# Patient Record
Sex: FEMALE | URBAN - METROPOLITAN AREA
[De-identification: names, ages, dates, MRNs, and addresses within clinical notes are randomized per-mention and may not be internally consistent; named-entity substitution may affect disease eponyms.]

---

## 2024-11-06 ENCOUNTER — NURSING HOME VISIT (OUTPATIENT)
Dept: POST ACUTE CARE | Facility: EXTERNAL LOCATION | Age: 84
End: 2024-11-06

## 2024-11-06 DIAGNOSIS — I10 ESSENTIAL HYPERTENSION: Primary | ICD-10-CM

## 2024-11-06 DIAGNOSIS — R53.1 WEAKNESS: ICD-10-CM

## 2024-11-06 DIAGNOSIS — K21.9 GASTROESOPHAGEAL REFLUX DISEASE, UNSPECIFIED WHETHER ESOPHAGITIS PRESENT: ICD-10-CM

## 2024-11-06 NOTE — LETTER
Patient: Walter Bateman  : 1940    Encounter Date: 2024    PROGRESS NOTE    Subjective  Chief complaint: Walter Bateman is a 84 y.o. female who is an acute skilled patient being seen and evaluated for weakness    HPI:  Patient has no new complaints or concerns today. Continues working towards goals in therapy. Denies constitutional symptoms.    Objective  Vital signs: 125/60, 98, 90, 16, 98%    Physical Exam  Constitutional:       General: She is not in acute distress.  Eyes:      Extraocular Movements: Extraocular movements intact.   Pulmonary:      Effort: Pulmonary effort is normal.   Musculoskeletal:      Cervical back: Neck supple.   Neurological:      Mental Status: She is alert.   Psychiatric:         Mood and Affect: Mood normal.         Behavior: Behavior is cooperative.         Assessment/Plan  Problem List Items Addressed This Visit       Essential hypertension - Primary     Monitor blood pressure  Amlodipine  Lisinopril  Atenolol  Reduce sodium diet         GERD (gastroesophageal reflux disease)     PPI  Monitor GI symptoms            Weakness     Continue to work with therapy          Medications, treatments, and labs reviewed  Continue medications and treatments as listed in [facility]    Scribe Attestation  I, Stanley Becerril   attest that this documentation has been prepared under the direction and in the presence of LUMA Fulton.    Provider Attestation - Scribe documentation  All medical record entries made by the Scribe were at my direction and personally dictated by me. I have reviewed the chart and agree that the record accurately reflects my personal performance of the history, physical exam, discussion and plan.    LUMA Fulton        Electronically Signed By: LUMA Fulton   24  5:09 PM

## 2024-11-07 ENCOUNTER — NURSING HOME VISIT (OUTPATIENT)
Dept: POST ACUTE CARE | Facility: EXTERNAL LOCATION | Age: 84
End: 2024-11-07

## 2024-11-07 DIAGNOSIS — K21.9 GASTROESOPHAGEAL REFLUX DISEASE, UNSPECIFIED WHETHER ESOPHAGITIS PRESENT: ICD-10-CM

## 2024-11-07 DIAGNOSIS — R53.1 WEAKNESS: ICD-10-CM

## 2024-11-07 DIAGNOSIS — I10 ESSENTIAL HYPERTENSION: ICD-10-CM

## 2024-11-07 DIAGNOSIS — D46.9 MYELODYSPLASTIC SYNDROME (MULTI): Primary | ICD-10-CM

## 2024-11-07 NOTE — PROGRESS NOTES
HISTORY & PHYSICAL    Subjective   Chief complaint: Walter Bateman is a 84 y.o. female who is being seen and evaluated for multiple medical problems.  Patient admitted to SNF for therapy due to weakness after recent hospitalization.    HPI:  HPI  Patient is an 84-year-old female presenting for admission nursing facility following hospitalization.  Patient has past history significant for mild dysplastic syndrome, GERD, hypertension, hyperkalemia, thrombocytopenia, cognitive communication deficit and generalized weakness.  Patient was treated in the hospital for mild dysplastic syndrome.  Patient was hemodynamically stable to discharge to SNF for continued medical management and further therapy.  Patient is seen and examined at the bedside, appears to be in no acute distress.  Patient denies constitutional symptoms.  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    Family History   Problem Relation Name Age of Onset    No Known Problems Mother      No Known Problems Father              Vital signs: 125/60, 9012, 98.0, 98%    Objective   Physical Exam  Constitutional:       General: She is not in acute distress.  Eyes:      Extraocular Movements: Extraocular movements intact.   Cardiovascular:      Rate and Rhythm: Normal rate and regular rhythm.   Pulmonary:      Effort: Pulmonary effort is normal.      Breath sounds: Normal breath sounds.   Abdominal:      General: Bowel sounds are normal.      Palpations: Abdomen is soft.   Musculoskeletal:      Cervical back: Neck supple.      Right lower leg: No edema.      Left lower leg: No edema.   Neurological:      Mental Status: She is alert.   Psychiatric:         Mood and Affect: Mood normal.          Behavior: Behavior is cooperative.         Assessment/Plan   Problem List Items Addressed This Visit       Essential hypertension     Monitor blood pressure  Amlodipine  Lisinopril  Atenolol  Reduce sodium diet         Myelodysplastic syndrome (Multi) - Primary     Following outpatient         GERD (gastroesophageal reflux disease)     PPI  Monitor GI symptoms         Weakness     Therapy          Hospital records reviewed  Medications, treatments, and labs reviewed  Continue medications and treatments as listed in EMR  Discussed with nursing and therapy      Scribe Attestation  I, Stanley Arthur   attest that this documentation has been prepared under the direction and in the presence of Tricia Thompson MD    Provider Attestation - Scribe documentation  All medical record entries made by the Scribe were at my direction and personally dictated by me. I have reviewed the chart and agree that the record accurately reflects my personal performance of the history, physical exam, discussion and plan.   Tricia Thompson MD

## 2024-11-07 NOTE — PROGRESS NOTES
PROGRESS NOTE    Subjective   Chief complaint: Walter Bateman is a 84 y.o. female who is an acute skilled patient being seen and evaluated for weakness    HPI:  Patient has no new complaints or concerns today. Continues working towards goals in therapy. Denies constitutional symptoms.    Objective   Vital signs: 125/60, 98, 90, 16, 98%    Physical Exam  Constitutional:       General: She is not in acute distress.  Eyes:      Extraocular Movements: Extraocular movements intact.   Pulmonary:      Effort: Pulmonary effort is normal.   Musculoskeletal:      Cervical back: Neck supple.   Neurological:      Mental Status: She is alert.   Psychiatric:         Mood and Affect: Mood normal.         Behavior: Behavior is cooperative.         Assessment/Plan   Problem List Items Addressed This Visit       Essential hypertension - Primary     Monitor blood pressure  Amlodipine  Lisinopril  Atenolol  Reduce sodium diet         GERD (gastroesophageal reflux disease)     PPI  Monitor GI symptoms            Weakness     Continue to work with therapy          Medications, treatments, and labs reviewed  Continue medications and treatments as listed in [facility]    Scribe Attestation  Starla NIEVES Scribe   attest that this documentation has been prepared under the direction and in the presence of LUMA Fulton.    Provider Attestation - Scribe documentation  All medical record entries made by the Scribe were at my direction and personally dictated by me. I have reviewed the chart and agree that the record accurately reflects my personal performance of the history, physical exam, discussion and plan.    LUMA Fulton

## 2024-11-07 NOTE — LETTER
Patient: Walter Bateman  : 1940    Encounter Date: 2024    HISTORY & PHYSICAL    Subjective  Chief complaint: Walter Bateman is a 84 y.o. female who is being seen and evaluated for multiple medical problems.  Patient admitted to SNF for therapy due to weakness after recent hospitalization.    HPI:  HPI  Patient is an 84-year-old female presenting for admission nursing facility following hospitalization.  Patient has past history significant for mild dysplastic syndrome, GERD, hypertension, hyperkalemia, thrombocytopenia, cognitive communication deficit and generalized weakness.  Patient was treated in the hospital for mild dysplastic syndrome.  Patient was hemodynamically stable to discharge to SNF for continued medical management and further therapy.  Patient is seen and examined at the bedside, appears to be in no acute distress.  Patient denies constitutional symptoms.  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    Family History   Problem Relation Name Age of Onset   • No Known Problems Mother     • No Known Problems Father              Vital signs: 125/60, 9012, 98.0, 98%    Objective  Physical Exam  Constitutional:       General: She is not in acute distress.  Eyes:      Extraocular Movements: Extraocular movements intact.   Cardiovascular:      Rate and Rhythm: Normal rate and regular rhythm.   Pulmonary:      Effort: Pulmonary effort is normal.      Breath sounds: Normal breath sounds.   Abdominal:      General: Bowel sounds are normal.      Palpations: Abdomen is soft.   Musculoskeletal:      Cervical back: Neck supple.      Right lower leg: No edema.      Left lower leg: No edema.   Neurological:      Mental  Status: She is alert.   Psychiatric:         Mood and Affect: Mood normal.         Behavior: Behavior is cooperative.         Assessment/Plan  Problem List Items Addressed This Visit       Essential hypertension     Monitor blood pressure  Amlodipine  Lisinopril  Atenolol  Reduce sodium diet         Myelodysplastic syndrome (Multi) - Primary     Following outpatient         GERD (gastroesophageal reflux disease)     PPI  Monitor GI symptoms         Weakness     Therapy          Hospital records reviewed  Medications, treatments, and labs reviewed  Continue medications and treatments as listed in EMR  Discussed with nursing and therapy      Scribe Attestation  ILeona Scribe   attest that this documentation has been prepared under the direction and in the presence of Tricia Thompson MD    Provider Attestation - Scribe documentation  All medical record entries made by the Scribe were at my direction and personally dictated by me. I have reviewed the chart and agree that the record accurately reflects my personal performance of the history, physical exam, discussion and plan.   Tricia Thompson MD      Electronically Signed By: Tricia Thompson MD   11/8/24 12:09 PM

## 2024-11-08 ENCOUNTER — NURSING HOME VISIT (OUTPATIENT)
Dept: POST ACUTE CARE | Facility: EXTERNAL LOCATION | Age: 84
End: 2024-11-08

## 2024-11-08 DIAGNOSIS — R53.1 WEAKNESS: ICD-10-CM

## 2024-11-08 DIAGNOSIS — K21.9 GASTROESOPHAGEAL REFLUX DISEASE, UNSPECIFIED WHETHER ESOPHAGITIS PRESENT: ICD-10-CM

## 2024-11-08 DIAGNOSIS — I10 ESSENTIAL HYPERTENSION: Primary | ICD-10-CM

## 2024-11-08 NOTE — LETTER
Patient: Walter Bateman  : 1940    Encounter Date: 2024    PROGRESS NOTE    Subjective  Chief complaint: Walter Bateman is a 84 y.o. female who is an acute skilled patient being seen and evaluated for weakness    HPI:  Patient seen and examined at bedside. Patient denies n/v/f/c. Continues working in therapy. No new complaints.    Objective  Vital signs: 109/61, 98.4, 93, 16, 98%    Physical Exam  Constitutional:       General: She is not in acute distress.  Eyes:      Extraocular Movements: Extraocular movements intact.   Pulmonary:      Effort: Pulmonary effort is normal.   Musculoskeletal:      Cervical back: Neck supple.   Neurological:      Mental Status: She is alert.   Psychiatric:         Mood and Affect: Mood normal.         Behavior: Behavior is cooperative.         Assessment/Plan  Problem List Items Addressed This Visit       Essential hypertension - Primary     Monitor blood pressure  Amlodipine  Lisinopril  Atenolol  Reduce sodium diet         GERD (gastroesophageal reflux disease)     PPI  Monitor GI symptoms            Weakness     Continue to work with therapy          Medications, treatments, and labs reviewed  Continue medications and treatments as listed in [facility]    Scribe Attestation  IStarla Scribe   attest that this documentation has been prepared under the direction and in the presence of LUMA Fulton.    Provider Attestation - Scribe documentation  All medical record entries made by the Scribe were at my direction and personally dictated by me. I have reviewed the chart and agree that the record accurately reflects my personal performance of the history, physical exam, discussion and plan.    LUMA Fulton        Electronically Signed By: LUMA Fulton   24 10:30 AM

## 2024-11-11 ENCOUNTER — NURSING HOME VISIT (OUTPATIENT)
Dept: POST ACUTE CARE | Facility: EXTERNAL LOCATION | Age: 84
End: 2024-11-11

## 2024-11-11 DIAGNOSIS — D46.9 MYELODYSPLASTIC SYNDROME (MULTI): Primary | ICD-10-CM

## 2024-11-11 DIAGNOSIS — K21.9 GASTROESOPHAGEAL REFLUX DISEASE, UNSPECIFIED WHETHER ESOPHAGITIS PRESENT: ICD-10-CM

## 2024-11-11 DIAGNOSIS — I10 ESSENTIAL HYPERTENSION: ICD-10-CM

## 2024-11-11 DIAGNOSIS — R53.1 WEAKNESS: ICD-10-CM

## 2024-11-11 PROBLEM — R11.0 NAUSEA: Status: ACTIVE | Noted: 2024-11-11

## 2024-11-11 NOTE — PROGRESS NOTES
PROGRESS NOTE    Subjective   Chief complaint: Walter Bateman is a 84 y.o. female who is an acute skilled patient being seen and evaluated for weakness    HPI:  HPI  Therapy has been working with the patient to improve strength and endurance with ADLs, transfers, and mobility.  Patient is ambulating with a walker up to 60 feet requiring CGA.  Patient continues to work toward goals.  Patient did have complaints of nausea and diarrhea, reporting has been intermittent since recent cholecystectomy.  Continue to monitor.  Nursing staff voices no new concerns today.  Patient was seen and examined at the bedside, appears to be in no acute distress.    Objective   Vital signs: 128/72, 92, 17, 98.1, 94%    Physical Exam  Constitutional:       General: She is not in acute distress.  Eyes:      Extraocular Movements: Extraocular movements intact.   Cardiovascular:      Rate and Rhythm: Normal rate and regular rhythm.   Pulmonary:      Effort: Pulmonary effort is normal.      Breath sounds: Normal breath sounds.   Abdominal:      General: Bowel sounds are normal.      Palpations: Abdomen is soft.   Musculoskeletal:      Cervical back: Neck supple.      Right lower leg: No edema.      Left lower leg: No edema.   Neurological:      Mental Status: She is alert.      Motor: Weakness present.   Psychiatric:         Mood and Affect: Mood normal.         Behavior: Behavior is cooperative.         Assessment/Plan   Problem List Items Addressed This Visit       Essential hypertension     Monitor blood pressure  Amlodipine  Lisinopril  Atenolol  Reduce sodium diet         Myelodysplastic syndrome (Multi) - Primary     Following outpatient         GERD (gastroesophageal reflux disease)     PPI  Monitor GI symptoms         Weakness     Work towards goals with therapy.          Medications, treatments, and labs reviewed  Continue medications and treatments as listed in EMR      Scribe Attestation  EZEQUIEL, Stanley Arthur   attest that this  documentation has been prepared under the direction and in the presence of Tricia Thompson MD    Provider Attestation - Scribe documentation  All medical record entries made by the Scribe were at my direction and personally dictated by me. I have reviewed the chart and agree that the record accurately reflects my personal performance of the history, physical exam, discussion and plan.   Tricia Thompson MD

## 2024-11-11 NOTE — LETTER
Patient: Walter Bateman  : 1940    Encounter Date: 2024    PROGRESS NOTE    Subjective  Chief complaint: Walter Bateman is a 84 y.o. female who is an acute skilled patient being seen and evaluated for weakness    HPI:  HPI  Therapy has been working with the patient to improve strength and endurance with ADLs, transfers, and mobility.  Patient is ambulating with a walker up to 60 feet requiring CGA.  Patient continues to work toward goals.  Patient did have complaints of nausea and diarrhea, reporting has been intermittent since recent cholecystectomy.  Continue to monitor.  Nursing staff voices no new concerns today.  Patient was seen and examined at the bedside, appears to be in no acute distress.    Objective  Vital signs: 128/72, 92, 17, 98.1, 94%    Physical Exam  Constitutional:       General: She is not in acute distress.  Eyes:      Extraocular Movements: Extraocular movements intact.   Cardiovascular:      Rate and Rhythm: Normal rate and regular rhythm.   Pulmonary:      Effort: Pulmonary effort is normal.      Breath sounds: Normal breath sounds.   Abdominal:      General: Bowel sounds are normal.      Palpations: Abdomen is soft.   Musculoskeletal:      Cervical back: Neck supple.      Right lower leg: No edema.      Left lower leg: No edema.   Neurological:      Mental Status: She is alert.      Motor: Weakness present.   Psychiatric:         Mood and Affect: Mood normal.         Behavior: Behavior is cooperative.         Assessment/Plan  Problem List Items Addressed This Visit       Essential hypertension     Monitor blood pressure  Amlodipine  Lisinopril  Atenolol  Reduce sodium diet         Myelodysplastic syndrome (Multi) - Primary     Following outpatient         GERD (gastroesophageal reflux disease)     PPI  Monitor GI symptoms         Weakness     Work towards goals with therapy.          Medications, treatments, and labs reviewed  Continue medications and treatments as listed in  EMR      Scribe Attestation  I, Stanley Arthur   attest that this documentation has been prepared under the direction and in the presence of Tricia Thompson MD    Provider Attestation - Scribe documentation  All medical record entries made by the Scribe were at my direction and personally dictated by me. I have reviewed the chart and agree that the record accurately reflects my personal performance of the history, physical exam, discussion and plan.   Tricia Thompson MD        Electronically Signed By: Tricia Thompson MD   11/12/24  1:25 PM

## 2024-11-12 ENCOUNTER — NURSING HOME VISIT (OUTPATIENT)
Dept: POST ACUTE CARE | Facility: EXTERNAL LOCATION | Age: 84
End: 2024-11-12

## 2024-11-12 DIAGNOSIS — I10 ESSENTIAL HYPERTENSION: ICD-10-CM

## 2024-11-12 DIAGNOSIS — D46.9 MYELODYSPLASTIC SYNDROME (MULTI): Primary | ICD-10-CM

## 2024-11-12 DIAGNOSIS — K21.9 GASTROESOPHAGEAL REFLUX DISEASE, UNSPECIFIED WHETHER ESOPHAGITIS PRESENT: ICD-10-CM

## 2024-11-12 DIAGNOSIS — R53.1 WEAKNESS: ICD-10-CM

## 2024-11-12 DIAGNOSIS — R11.0 NAUSEA: ICD-10-CM

## 2024-11-12 NOTE — LETTER
Patient: Walter Bateman  : 1940    Encounter Date: 2024    PROGRESS NOTE    Subjective  Chief complaint: Walter Bateman is a 84 y.o. female who is an acute skilled patient being seen and evaluated for weakness and monthly general medical care and follow-up.    HPI:  HPI  Patient presents for general medical care and f/u.  Patient seen and examined at bedside.  No issues per nursing.  Patient has no acute complaints.  Patient nausea and diarrhea have improved.  Patient does continue to work towards goals established with therapy.  HTN BP at goal.  Denies chest pain and headache.  GERD controlled.  Denies heartburn, regurgitation, epigastric discomfort, sour taste, and cough.  Mild dysplastic syndrome, follows outpatient.  Mentation at baseline, no acute distress.    Objective  Vital signs: 109/61, 93, 16, 98.4, 95%    Physical Exam  Constitutional:       General: She is not in acute distress.  Eyes:      Extraocular Movements: Extraocular movements intact.   Cardiovascular:      Rate and Rhythm: Normal rate and regular rhythm.   Pulmonary:      Effort: Pulmonary effort is normal.      Breath sounds: Normal breath sounds.   Abdominal:      General: Bowel sounds are normal.      Palpations: Abdomen is soft.   Musculoskeletal:      Cervical back: Neck supple.      Right lower leg: No edema.      Left lower leg: No edema.   Neurological:      Mental Status: She is alert.      Motor: Weakness present.   Psychiatric:         Mood and Affect: Mood normal.         Behavior: Behavior is cooperative.         Assessment/Plan  Problem List Items Addressed This Visit       Essential hypertension     Monitor blood pressure  Amlodipine  Lisinopril  Atenolol  Reduce sodium diet         Myelodysplastic syndrome (Multi) - Primary     Following outpatient         GERD (gastroesophageal reflux disease)     PPI  Monitor GI symptoms         Weakness     Continue to work with therapy         Nausea     Resolved           Medications, treatments, and labs reviewed  Continue medications and treatments as listed in EMR      Scribe Attestation  I, Stanley Arthur   attest that this documentation has been prepared under the direction and in the presence of Tricia Thompson MD    Provider Attestation - Scribe documentation  All medical record entries made by the Scribe were at my direction and personally dictated by me. I have reviewed the chart and agree that the record accurately reflects my personal performance of the history, physical exam, discussion and plan.   Tricia Thompson MD        Electronically Signed By: Tricia Thompson MD   11/13/24 12:43 PM

## 2024-11-12 NOTE — PROGRESS NOTES
PROGRESS NOTE    Subjective   Chief complaint: Walter Bateman is a 84 y.o. female who is an acute skilled patient being seen and evaluated for weakness and monthly general medical care and follow-up.    HPI:  HPI  Patient presents for general medical care and f/u.  Patient seen and examined at bedside.  No issues per nursing.  Patient has no acute complaints.  Patient nausea and diarrhea have improved.  Patient does continue to work towards goals established with therapy.  HTN BP at goal.  Denies chest pain and headache.  GERD controlled.  Denies heartburn, regurgitation, epigastric discomfort, sour taste, and cough.  Mild dysplastic syndrome, follows outpatient.  Mentation at baseline, no acute distress.    Objective   Vital signs: 109/61, 93, 16, 98.4, 95%    Physical Exam  Constitutional:       General: She is not in acute distress.  Eyes:      Extraocular Movements: Extraocular movements intact.   Cardiovascular:      Rate and Rhythm: Normal rate and regular rhythm.   Pulmonary:      Effort: Pulmonary effort is normal.      Breath sounds: Normal breath sounds.   Abdominal:      General: Bowel sounds are normal.      Palpations: Abdomen is soft.   Musculoskeletal:      Cervical back: Neck supple.      Right lower leg: No edema.      Left lower leg: No edema.   Neurological:      Mental Status: She is alert.      Motor: Weakness present.   Psychiatric:         Mood and Affect: Mood normal.         Behavior: Behavior is cooperative.         Assessment/Plan   Problem List Items Addressed This Visit       Essential hypertension     Monitor blood pressure  Amlodipine  Lisinopril  Atenolol  Reduce sodium diet         Myelodysplastic syndrome (Multi) - Primary     Following outpatient         GERD (gastroesophageal reflux disease)     PPI  Monitor GI symptoms         Weakness     Continue to work with therapy         Nausea     Resolved          Medications, treatments, and labs reviewed  Continue medications and  treatments as listed in EMR      Scribe Attestation  I, Stanley Arthur   attest that this documentation has been prepared under the direction and in the presence of Tricia Thompson MD    Provider Attestation - Scribe documentation  All medical record entries made by the Scribe were at my direction and personally dictated by me. I have reviewed the chart and agree that the record accurately reflects my personal performance of the history, physical exam, discussion and plan.   Tricia Thompson MD

## 2024-11-12 NOTE — PROGRESS NOTES
PROGRESS NOTE    Subjective   Chief complaint: Walter Bateman is a 84 y.o. female who is an acute skilled patient being seen and evaluated for weakness    HPI:  Patient seen and examined at bedside. Patient denies n/v/f/c. Continues working in therapy. No new complaints.    Objective   Vital signs: 109/61, 98.4, 93, 16, 98%    Physical Exam  Constitutional:       General: She is not in acute distress.  Eyes:      Extraocular Movements: Extraocular movements intact.   Pulmonary:      Effort: Pulmonary effort is normal.   Musculoskeletal:      Cervical back: Neck supple.   Neurological:      Mental Status: She is alert.   Psychiatric:         Mood and Affect: Mood normal.         Behavior: Behavior is cooperative.         Assessment/Plan   Problem List Items Addressed This Visit       Essential hypertension - Primary     Monitor blood pressure  Amlodipine  Lisinopril  Atenolol  Reduce sodium diet         GERD (gastroesophageal reflux disease)     PPI  Monitor GI symptoms            Weakness     Continue to work with therapy          Medications, treatments, and labs reviewed  Continue medications and treatments as listed in [facility]    Scribe Attestation  Starla NIEVES Scribe   attest that this documentation has been prepared under the direction and in the presence of LUMA Fulton.    Provider Attestation - Scribe documentation  All medical record entries made by the Scribe were at my direction and personally dictated by me. I have reviewed the chart and agree that the record accurately reflects my personal performance of the history, physical exam, discussion and plan.    LUMA Fulton

## 2024-11-13 ENCOUNTER — NURSING HOME VISIT (OUTPATIENT)
Dept: POST ACUTE CARE | Facility: EXTERNAL LOCATION | Age: 84
End: 2024-11-13
Payer: MEDICARE

## 2024-11-13 DIAGNOSIS — F41.9 ANXIETY: Primary | ICD-10-CM

## 2024-11-13 DIAGNOSIS — R53.1 WEAKNESS: ICD-10-CM

## 2024-11-13 DIAGNOSIS — I10 ESSENTIAL HYPERTENSION: ICD-10-CM

## 2024-11-13 PROCEDURE — 99309 SBSQ NF CARE MODERATE MDM 30: CPT | Performed by: REGISTERED NURSE

## 2024-11-13 NOTE — LETTER
Patient: Walter Bateman  : 1940    Encounter Date: 2024    PROGRESS NOTE    Subjective  Chief complaint: Walter Bateman is a 84 y.o. female who is an acute skilled patient being seen and evaluated for weakness    HPI:  Patient in therapy d/t generalized weakness. Patient presents for f/u. Continues to work toward goals in therapy. No new complaints at this time.      Pt will likely dc home on hospice service      Objective  Vital signs: 112/64, 98.1, 92, 12, 97%    Physical Exam  Constitutional:       General: She is not in acute distress.  Eyes:      Extraocular Movements: Extraocular movements intact.   Pulmonary:      Effort: Pulmonary effort is normal.   Musculoskeletal:      Cervical back: Neck supple.   Neurological:      Mental Status: She is alert.   Psychiatric:         Mood and Affect: Mood normal.         Behavior: Behavior is cooperative.         Assessment/Plan  Problem List Items Addressed This Visit       Essential hypertension     Monitor blood pressure  Amlodipine  Lisinopril  Atenolol  Reduce sodium diet            Weakness     Continue to work with therapy         Anxiety - Primary     Hydroxyzine           Medications, treatments, and labs reviewed  Continue medications and treatments as listed in [facility]    Scribe Attestation  I, Stanley Becerril   attest that this documentation has been prepared under the direction and in the presence of LUMA Fulton.    Provider Attestation - Scribe documentation  All medical record entries made by the Scribe were at my direction and personally dictated by me. I have reviewed the chart and agree that the record accurately reflects my personal performance of the history, physical exam, discussion and plan.    LUMA Fulton        Electronically Signed By: LUMA Fulton   24  3:47 PM

## 2024-11-14 ENCOUNTER — NURSING HOME VISIT (OUTPATIENT)
Dept: POST ACUTE CARE | Facility: EXTERNAL LOCATION | Age: 84
End: 2024-11-14

## 2024-11-14 DIAGNOSIS — K21.9 GASTROESOPHAGEAL REFLUX DISEASE, UNSPECIFIED WHETHER ESOPHAGITIS PRESENT: ICD-10-CM

## 2024-11-14 DIAGNOSIS — D46.9 MYELODYSPLASTIC SYNDROME (MULTI): ICD-10-CM

## 2024-11-14 DIAGNOSIS — I10 ESSENTIAL HYPERTENSION: Primary | ICD-10-CM

## 2024-11-14 DIAGNOSIS — R53.1 WEAKNESS: ICD-10-CM

## 2024-11-14 NOTE — LETTER
Patient: Walter Bateman  : 1940    Encounter Date: 2024    PROGRESS NOTE    Subjective  Chief complaint: Walter Bateman is a 84 y.o. female who is an acute skilled patient being seen and evaluated for weakness    HPI:  HPI  Patient presents for f/u therapy and general medical care.  Patient seen and examined at bedside.  Therapy has been working with the patient to improve strength, endurance, ADLs, and transfers d/t generalized weakness.  Patient is working towards goals.  Patient denies chest pain, shortness of breath, nausea vomiting fever chills.  Patient has no acute concerns today.  Patient will be transitioning to long-term care soon.      Objective  Vital signs: 112/64, 92, 12, 98.1, 97%    Physical Exam  Constitutional:       General: She is not in acute distress.  Eyes:      Extraocular Movements: Extraocular movements intact.   Cardiovascular:      Rate and Rhythm: Normal rate and regular rhythm.   Pulmonary:      Effort: Pulmonary effort is normal.      Breath sounds: Normal breath sounds.   Abdominal:      General: Bowel sounds are normal.      Palpations: Abdomen is soft.   Musculoskeletal:      Cervical back: Neck supple.      Right lower leg: No edema.      Left lower leg: No edema.   Neurological:      Mental Status: She is alert.      Motor: Weakness present.   Psychiatric:         Mood and Affect: Mood normal.         Behavior: Behavior is cooperative.         Assessment/Plan  Problem List Items Addressed This Visit       Essential hypertension - Primary     Monitor blood pressure  Amlodipine  Lisinopril  Atenolol  Reduce sodium diet         Myelodysplastic syndrome (Multi)     Following outpatient         GERD (gastroesophageal reflux disease)     PPI  Monitor GI symptoms         Weakness     Work towards goals established with therapy          Medications, treatments, and labs reviewed  Continue medications and treatments as listed in EMR      Scribe Attestation  I, Leona Hunt,  Scribe   attest that this documentation has been prepared under the direction and in the presence of Tricia Thompson MD    Provider Attestation - Scribe documentation  All medical record entries made by the Scribe were at my direction and personally dictated by me. I have reviewed the chart and agree that the record accurately reflects my personal performance of the history, physical exam, discussion and plan.   Tricia Thompson MD        Electronically Signed By: Tricia Thompson MD   11/15/24 11:45 AM

## 2024-11-14 NOTE — PROGRESS NOTES
PROGRESS NOTE    Subjective   Chief complaint: Walter Bateman is a 84 y.o. female who is an acute skilled patient being seen and evaluated for weakness    HPI:  Patient in therapy d/t generalized weakness. Patient presents for f/u. Continues to work toward goals in therapy. No new complaints at this time.      Pt will likely dc home on hospice service      Objective   Vital signs: 112/64, 98.1, 92, 12, 97%    Physical Exam  Constitutional:       General: She is not in acute distress.  Eyes:      Extraocular Movements: Extraocular movements intact.   Pulmonary:      Effort: Pulmonary effort is normal.   Musculoskeletal:      Cervical back: Neck supple.   Neurological:      Mental Status: She is alert.   Psychiatric:         Mood and Affect: Mood normal.         Behavior: Behavior is cooperative.         Assessment/Plan   Problem List Items Addressed This Visit       Essential hypertension     Monitor blood pressure  Amlodipine  Lisinopril  Atenolol  Reduce sodium diet            Weakness     Continue to work with therapy         Anxiety - Primary     Hydroxyzine           Medications, treatments, and labs reviewed  Continue medications and treatments as listed in [facility]    Scribe Attestation  IStarla Scribe   attest that this documentation has been prepared under the direction and in the presence of LUMA Fulton.    Provider Attestation - Scribe documentation  All medical record entries made by the Scribe were at my direction and personally dictated by me. I have reviewed the chart and agree that the record accurately reflects my personal performance of the history, physical exam, discussion and plan.    LUMA Fulton

## 2024-11-14 NOTE — PROGRESS NOTES
PROGRESS NOTE    Subjective   Chief complaint: Walter Bateman is a 84 y.o. female who is an acute skilled patient being seen and evaluated for weakness    HPI:  HPI  Patient presents for f/u therapy and general medical care.  Patient seen and examined at bedside.  Therapy has been working with the patient to improve strength, endurance, ADLs, and transfers d/t generalized weakness.  Patient is working towards goals.  Patient denies chest pain, shortness of breath, nausea vomiting fever chills.  Patient has no acute concerns today.  Patient will be transitioning to long-term care soon.      Objective   Vital signs: 112/64, 92, 12, 98.1, 97%    Physical Exam  Constitutional:       General: She is not in acute distress.  Eyes:      Extraocular Movements: Extraocular movements intact.   Cardiovascular:      Rate and Rhythm: Normal rate and regular rhythm.   Pulmonary:      Effort: Pulmonary effort is normal.      Breath sounds: Normal breath sounds.   Abdominal:      General: Bowel sounds are normal.      Palpations: Abdomen is soft.   Musculoskeletal:      Cervical back: Neck supple.      Right lower leg: No edema.      Left lower leg: No edema.   Neurological:      Mental Status: She is alert.      Motor: Weakness present.   Psychiatric:         Mood and Affect: Mood normal.         Behavior: Behavior is cooperative.         Assessment/Plan   Problem List Items Addressed This Visit       Essential hypertension - Primary     Monitor blood pressure  Amlodipine  Lisinopril  Atenolol  Reduce sodium diet         Myelodysplastic syndrome (Multi)     Following outpatient         GERD (gastroesophageal reflux disease)     PPI  Monitor GI symptoms         Weakness     Work towards goals established with therapy          Medications, treatments, and labs reviewed  Continue medications and treatments as listed in EMR      Mary Carmenibe Attestation  EZEQUIEL, Stanley Arthur   attest that this documentation has been prepared under the  direction and in the presence of Tricia Thompson MD    Provider Attestation - Scribe documentation  All medical record entries made by the Scribe were at my direction and personally dictated by me. I have reviewed the chart and agree that the record accurately reflects my personal performance of the history, physical exam, discussion and plan.   Tricia Thompson MD

## 2024-11-14 NOTE — ASSESSMENT & PLAN NOTE
Continue to work with therapy  
Monitor blood pressure  Amlodipine  Lisinopril  Atenolol  Reduce sodium diet  
PPI  Monitor GI symptoms     
[phone]

## 2024-11-20 PROBLEM — F41.9 ANXIETY: Status: ACTIVE | Noted: 2024-11-20
